# Patient Record
Sex: MALE | Race: BLACK OR AFRICAN AMERICAN | Employment: PART TIME | URBAN - METROPOLITAN AREA
[De-identification: names, ages, dates, MRNs, and addresses within clinical notes are randomized per-mention and may not be internally consistent; named-entity substitution may affect disease eponyms.]

---

## 2017-07-15 ENCOUNTER — HOSPITAL ENCOUNTER (EMERGENCY)
Age: 32
Discharge: HOME | End: 2017-07-15
Attending: FAMILY MEDICINE

## 2017-07-15 ENCOUNTER — APPOINTMENT (OUTPATIENT)
Dept: GENERAL RADIOLOGY | Age: 32
End: 2017-07-15
Attending: PHYSICIAN ASSISTANT

## 2017-07-15 VITALS
OXYGEN SATURATION: 97 % | DIASTOLIC BLOOD PRESSURE: 77 MMHG | HEIGHT: 61 IN | TEMPERATURE: 100 F | SYSTOLIC BLOOD PRESSURE: 122 MMHG | RESPIRATION RATE: 16 BRPM | WEIGHT: 143 LBS | HEART RATE: 100 BPM | BODY MASS INDEX: 27 KG/M2

## 2017-07-15 DIAGNOSIS — J20.9 ACUTE BRONCHITIS, UNSPECIFIED ORGANISM: Primary | ICD-10-CM

## 2017-07-15 RX ORDER — AZITHROMYCIN 250 MG/1
TABLET, FILM COATED ORAL
Qty: 6 TAB | Refills: 0 | Status: SHIPPED | OUTPATIENT
Start: 2017-07-15

## 2017-07-15 RX ORDER — CODEINE PHOSPHATE AND GUAIFENESIN 10; 100 MG/5ML; MG/5ML
5 SOLUTION ORAL
Qty: 120 ML | Refills: 0 | Status: SHIPPED | OUTPATIENT
Start: 2017-07-15

## 2017-07-15 NOTE — LETTER
[facility] 
[address] [address] 
[phone] Work/School Note Date: 7/15/2017 To Whom It May concern: 
 
Cristino Finn was seen and treated today in the emergency room by the following provider(s): 
Attending Provider: Joanie Lemus MD 
Physician Assistant: Rusty Hanson. Cristino Finn may return to work on 07/17/17. Sincerely, Rusty Hanson

## 2017-07-17 NOTE — UC PROVIDER NOTE
Patient is a 29 y.o. male presenting with cough. The history is provided by the patient. Cough   This is a new problem. The current episode started more than 1 week ago. The problem occurs every few minutes. The problem has not changed since onset. The cough is non-productive. There has been no fever. Associated symptoms include ear pain, headaches and rhinorrhea. Pertinent negatives include no chest pain, no chills, no sweats, no weight loss, no ear congestion, no sore throat, no myalgias, no shortness of breath, no wheezing, no nausea and no vomiting. He has tried nothing for the symptoms. He is not a smoker. His past medical history does not include bronchitis, pneumonia, asthma or heart failure. History reviewed. No pertinent past medical history. History reviewed. No pertinent surgical history. History reviewed. No pertinent family history. Social History     Social History    Marital status:      Spouse name: N/A    Number of children: N/A    Years of education: N/A     Occupational History    Not on file. Social History Main Topics    Smoking status: Never Smoker    Smokeless tobacco: Never Used    Alcohol use Not on file    Drug use: Not on file    Sexual activity: Not on file     Other Topics Concern    Not on file     Social History Narrative    No narrative on file                ALLERGIES: Review of patient's allergies indicates no known allergies. Review of Systems   Constitutional: Negative for chills and weight loss. HENT: Positive for ear pain and rhinorrhea. Negative for sore throat. Respiratory: Positive for cough. Negative for shortness of breath and wheezing. Cardiovascular: Negative for chest pain. Gastrointestinal: Negative for nausea and vomiting. Musculoskeletal: Negative for myalgias. Neurological: Positive for headaches.        Vitals:    07/15/17 1250   BP: 122/77   Pulse: 100   Resp: 16   Temp: 100 °F (37.8 °C)   SpO2: 97% Weight: 64.9 kg (143 lb)   Height: 5' 1\" (1.549 m)       Physical Exam   Constitutional: He is oriented to person, place, and time. He appears well-developed and well-nourished. HENT:   Right Ear: External ear normal.   Left Ear: External ear normal.   Mouth/Throat: Oropharynx is clear and moist. No oropharyngeal exudate. Eyes: Conjunctivae and EOM are normal. Right eye exhibits no discharge. No scleral icterus. Cardiovascular: Normal rate, regular rhythm and normal heart sounds. Exam reveals no gallop and no friction rub. No murmur heard. Pulmonary/Chest: Effort normal and breath sounds normal. No respiratory distress. He has no wheezes. He has no rales. Musculoskeletal: Normal range of motion. He exhibits no edema or tenderness. Neurological: He is alert and oriented to person, place, and time. Skin: Skin is warm and dry. Psychiatric: He has a normal mood and affect. His behavior is normal. Judgment and thought content normal.   Nursing note and vitals reviewed. MDM     Differential Diagnosis; Clinical Impression; Plan:     CLINICAL IMPRESSION:  Acute bronchitis, unspecified organism  (primary encounter diagnosis)    Plan: 1. Zithromax   2. Robitussin AC  3. Amount and/or Complexity of Data Reviewed:   Tests in the radiology section of CPT®:  Ordered and reviewed  Risk of Significant Complications, Morbidity, and/or Mortality:   Presenting problems: Moderate  Diagnostic procedures: Moderate  Management options:   Moderate  Progress:   Patient progress:  Stable      Procedures